# Patient Record
Sex: FEMALE | Race: WHITE | Employment: FULL TIME | URBAN - METROPOLITAN AREA
[De-identification: names, ages, dates, MRNs, and addresses within clinical notes are randomized per-mention and may not be internally consistent; named-entity substitution may affect disease eponyms.]

---

## 2017-04-21 ENCOUNTER — OFFICE VISIT (OUTPATIENT)
Dept: GASTROENTEROLOGY | Facility: CLINIC | Age: 52
End: 2017-04-21

## 2017-04-21 ENCOUNTER — TELEPHONE (OUTPATIENT)
Dept: GASTROENTEROLOGY | Facility: CLINIC | Age: 52
End: 2017-04-21

## 2017-04-21 VITALS
HEART RATE: 80 BPM | BODY MASS INDEX: 30.37 KG/M2 | WEIGHT: 189 LBS | HEIGHT: 66 IN | SYSTOLIC BLOOD PRESSURE: 116 MMHG | DIASTOLIC BLOOD PRESSURE: 74 MMHG

## 2017-04-21 DIAGNOSIS — Z80.0 FAMILY HISTORY OF COLON CANCER: ICD-10-CM

## 2017-04-21 DIAGNOSIS — K59.00 CONSTIPATION, UNSPECIFIED CONSTIPATION TYPE: ICD-10-CM

## 2017-04-21 DIAGNOSIS — R12 HEARTBURN: ICD-10-CM

## 2017-04-21 DIAGNOSIS — Z12.11 ENCOUNTER FOR SCREENING COLONOSCOPY: Primary | ICD-10-CM

## 2017-04-21 PROBLEM — E78.5 DYSLIPIDEMIA: Status: ACTIVE | Noted: 2017-04-21

## 2017-04-21 PROBLEM — K63.5 POLYP OF COLON: Status: ACTIVE | Noted: 2017-04-21

## 2017-04-21 PROBLEM — E66.9 ADIPOSITY: Status: ACTIVE | Noted: 2017-04-21

## 2017-04-21 PROBLEM — E55.9 VITAMIN D DEFICIENCY: Status: ACTIVE | Noted: 2017-04-21

## 2017-04-21 PROCEDURE — 99244 OFF/OP CNSLTJ NEW/EST MOD 40: CPT | Performed by: PHYSICIAN ASSISTANT

## 2017-04-21 PROCEDURE — 99213 OFFICE O/P EST LOW 20 MIN: CPT | Performed by: PHYSICIAN ASSISTANT

## 2017-04-21 RX ORDER — ATORVASTATIN CALCIUM 20 MG/1
20 TABLET, FILM COATED ORAL NIGHTLY
COMMUNITY

## 2017-04-21 RX ORDER — ASPIRIN 81 MG/1
81 TABLET ORAL
COMMUNITY
Start: 2017-02-08

## 2017-04-21 RX ORDER — GLIMEPIRIDE 2 MG/1
2 TABLET ORAL
COMMUNITY
Start: 2017-02-08

## 2017-04-21 NOTE — TELEPHONE ENCOUNTER
Scheduled for:  Colonoscopy 97[address]  Provider Name: Dr Jolanta Howard  Date:  Ajit Bailey 5/26/17  Location:  [facility]  Sedation:  IV  Time:  12:30 pm  Prep: colyte  Meds/Allergies Reconciled?:  NKA  Diagnosis with codes:  Colon cancer screening Z12.11  Was patient informed to call

## 2017-04-21 NOTE — H&P
[address] [facility] [address] Gastroenterology                                                                                                  Clinic History and Physical     Pa shortness of breath. Of note, patient states that she is currently under the care of endocrinologist at [facility] for management of her diabetes. She has been doing better with these oral medications and administration of Victoza.   She denies any history of 1.8 mg into the skin. Disp:  Rfl:    MetFORMIN HCl 500 MG Oral Tab Take 500 mg by mouth. Disp:  Rfl:    Atorvastatin Calcium 20 MG Oral Tab Take 20 mg by mouth nightly.  Disp:  Rfl:    Cholecalciferol (VITAMIN D) 1000 units Oral Tab Take 1,000 Units by mout family hx of colon cancer in her father and personal hx of colon polyps who presents for colon cancer screening evaluation. #Screening Colonoscopy: Patient is currently asymptomatic and denies diarrhea, hematochezia, thin-stools or weight loss.  We disc patient    Colonoscopy consent: I have discussed the risks, benefits, and alternatives to colonoscopy with the patient [who demonstrated understanding], including but not limited to the risks of bleeding, infection, pain, death, as well as the risks of ane

## 2017-04-21 NOTE — PATIENT INSTRUCTIONS
1. Schedule colonoscopy with Dr. Navin Berry with IV twilight sedation. 2.  bowel prep from pharmacy ([address])    3. Medication adjustment:       A. Day BEFORE colonoscopy: HOLD all diabetic medications (Including Victoza injection)     B.  Day OF c

## 2017-04-23 ENCOUNTER — TELEPHONE (OUTPATIENT)
Dept: GASTROENTEROLOGY | Facility: CLINIC | Age: 52
End: 2017-04-23

## 2017-05-26 ENCOUNTER — HOSPITAL ENCOUNTER (OUTPATIENT)
Facility: HOSPITAL | Age: 52
Setting detail: HOSPITAL OUTPATIENT SURGERY
Discharge: HOME | End: 2017-05-26
Attending: INTERNAL MEDICINE | Admitting: INTERNAL MEDICINE
Payer: COMMERCIAL

## 2017-05-26 ENCOUNTER — SURGERY (OUTPATIENT)
Age: 52
End: 2017-05-26

## 2017-05-26 PROBLEM — K64.8 INTERNAL HEMORRHOIDS: Status: ACTIVE | Noted: 2017-05-26

## 2017-05-26 PROCEDURE — 45378 DIAGNOSTIC COLONOSCOPY: CPT | Performed by: INTERNAL MEDICINE

## 2017-05-26 PROCEDURE — 99152 MOD SED SAME PHYS/QHP 5/>YRS: CPT | Performed by: INTERNAL MEDICINE

## 2017-05-26 PROCEDURE — 0DJD8ZZ INSPECTION OF LOWER INTESTINAL TRACT, VIA NATURAL OR ARTIFICIAL OPENING ENDOSCOPIC: ICD-10-PCS | Performed by: INTERNAL MEDICINE

## 2017-05-26 PROCEDURE — 99153 MOD SED SAME PHYS/QHP EA: CPT | Performed by: INTERNAL MEDICINE

## 2017-05-26 RX ORDER — SODIUM CHLORIDE, SODIUM LACTATE, POTASSIUM CHLORIDE, CALCIUM CHLORIDE 600; 310; 30; 20 MG/100ML; MG/100ML; MG/100ML; MG/100ML
INJECTION, SOLUTION INTRAVENOUS CONTINUOUS
Status: DISCONTINUED | OUTPATIENT
Start: 2017-05-26 | End: 2017-05-26

## 2017-05-26 RX ORDER — MIDAZOLAM HYDROCHLORIDE 1 MG/ML
1 INJECTION INTRAMUSCULAR; INTRAVENOUS EVERY 5 MIN PRN
Status: DISCONTINUED | OUTPATIENT
Start: 2017-05-26 | End: 2017-05-26

## 2017-05-26 RX ORDER — MIDAZOLAM HYDROCHLORIDE 1 MG/ML
INJECTION INTRAMUSCULAR; INTRAVENOUS
Status: DISCONTINUED | OUTPATIENT
Start: 2017-05-26 | End: 2017-05-26

## 2017-05-26 RX ORDER — SODIUM CHLORIDE 0.9 % (FLUSH) 0.9 %
10 SYRINGE (ML) INJECTION AS NEEDED
Status: DISCONTINUED | OUTPATIENT
Start: 2017-05-26 | End: 2017-05-26

## 2017-05-26 NOTE — BRIEF OP NOTE
Pre-Operative Diagnosis: Special screening for malignant neoplasm of colon     Post-Operative Diagnosis: normal, hemorrhoids     Procedure Performed:   Procedure(s):  COLONOSCOPY    Surgeon(s) and Role:     * Jackeline Andrew MD - Primary

## 2017-05-26 NOTE — H&P
History & Physical Examination    Patient Name: Justino Man  MRN: [id number]  CSN: [id number]  YOB: 1965    Diagnosis: colorectal screening      Prescriptions prior to admission:  aspirin EC 81 MG Oral Tab EC Take 81 mg by mouth.  Disp:  Rfl: Physical done within the last 30 days. Any changes noted above.     31 Danielle Ahmadi - Gastroenterology  5/26/2017  1:31 PM

## 2017-05-26 NOTE — BRIEF OP NOTE
Pre-Operative Diagnosis: Special screening for malignant neoplasm of colon     Post-Operative Diagnosis: c     Procedure Performed:   Procedure(s):  COLONOSCOPY    Surgeon(s) and Role:     * Laureano Nichole MD - Primary    Assistant(s):

## 2017-05-27 NOTE — OPERATIVE REPORT
[facility]    PATIENT'S NAME: Garcia Minks   ATTENDING PHYSICIAN: Larry Madden MD   OPERATING PHYSICIAN: Larry Madden MD   PATIENT ACCOUNT#:   [id number]    LOCATION:  [facility] ROOM 6 [address] other signs or symptoms develop in the interim.       Dictated By Caroline Hardy MD  d: 05/26/2017 13:58:43  t: 05/26/2017 19:34:25  [facility] [id number]  [facility]/

## 2017-05-30 VITALS
DIASTOLIC BLOOD PRESSURE: 54 MMHG | HEIGHT: 65 IN | WEIGHT: 187 LBS | HEART RATE: 92 BPM | SYSTOLIC BLOOD PRESSURE: 102 MMHG | BODY MASS INDEX: 31.16 KG/M2 | OXYGEN SATURATION: 100 % | RESPIRATION RATE: 14 BRPM

## 2017-05-31 ENCOUNTER — TELEPHONE (OUTPATIENT)
Dept: GASTROENTEROLOGY | Facility: CLINIC | Age: 52
End: 2017-05-31

## 2021-12-21 ENCOUNTER — LAB REQUISITION (OUTPATIENT)
Dept: LAB | Facility: HOSPITAL | Age: 56
End: 2021-12-21
Payer: COMMERCIAL

## 2021-12-21 DIAGNOSIS — Z20.828 CONTACT WITH AND (SUSPECTED) EXPOSURE TO OTHER VIRAL COMMUNICABLE DISEASES: ICD-10-CM

## (undated) DEVICE — Device: Brand: DEFENDO AIR/WATER/SUCTION AND BIOPSY VALVE

## (undated) DEVICE — ENDOSCOPY PACK - LOWER: Brand: MEDLINE INDUSTRIES, INC.

## (undated) NOTE — MR AVS SNAPSHOT
[facility]  [address] [place] [address] [phone]               Thank you for choosing us [facility] We are glad to serve you and happy to provide you with this summary of your visit.   Eugene harder stools. Try this on a Saturday when you are home from work. It may cause loose stools. Also, push water and walkking!         Allergies as of Apr 21, 2017     No Known Allergies                Today's Vital Signs     BP Pulse Height Weight BMI    116 your Zip Code and Date of Birth to complete the sign-up process. If you do not sign up before the expiration date, you must request a new code.     Your unique [employer] Access Code: [email or web address]  Expires: 6/20/2017  9:26 AM    If you have questions, you can c

## (undated) NOTE — IP AVS SNAPSHOT
[facility]    [address], [place], [place] ~ [phone]                Discharge Summary   5/26/2017    Susy Mckeon           Admission Information        Provider Department    5/26/2017 Bharat Pelayo MD [facility] 3. Activity: No driving until the following morning. Driving reflexes are slowed by the narcotics you were given during the procedure.  You may do normal daily activities other than work, exercise, and any transaction that involves significant decision [facility] None      Immunization History as of 5/26/2017  Never Reviewed    No immunizations on file.       Radiology Exams     None         Additional Information       We are concerned for your overall well being:    - If you are a smoker or have smoked in the las Support Staff. Remember, MyChart is NOT to be used for urgent needs. For medical emergencies, dial 911.